# Patient Record
Sex: FEMALE | Race: WHITE | NOT HISPANIC OR LATINO | Employment: OTHER | URBAN - METROPOLITAN AREA
[De-identification: names, ages, dates, MRNs, and addresses within clinical notes are randomized per-mention and may not be internally consistent; named-entity substitution may affect disease eponyms.]

---

## 2017-11-02 ENCOUNTER — HISTORICAL (OUTPATIENT)
Dept: ADMINISTRATIVE | Facility: HOSPITAL | Age: 62
End: 2017-11-02

## 2019-01-28 ENCOUNTER — HISTORICAL (OUTPATIENT)
Dept: ADMINISTRATIVE | Facility: HOSPITAL | Age: 64
End: 2019-01-28

## 2019-03-01 ENCOUNTER — HOSPITAL ENCOUNTER (OUTPATIENT)
Dept: MEDSURG UNIT | Facility: HOSPITAL | Age: 64
End: 2019-03-02
Attending: INTERNAL MEDICINE | Admitting: INTERNAL MEDICINE

## 2022-04-10 ENCOUNTER — HISTORICAL (OUTPATIENT)
Dept: ADMINISTRATIVE | Facility: HOSPITAL | Age: 67
End: 2022-04-10

## 2022-04-24 VITALS
SYSTOLIC BLOOD PRESSURE: 117 MMHG | DIASTOLIC BLOOD PRESSURE: 71 MMHG | BODY MASS INDEX: 44.95 KG/M2 | HEIGHT: 62 IN | WEIGHT: 244.25 LBS

## 2022-05-03 NOTE — HISTORICAL [facility]ER
This is a historical note converted from [employer]. Formatting and pictures may have been removed.  Please reference [employer] for original formatting and attached multimedia. Chief Complaint  BILATERAL KNEE PAIN SHE IS STILL HAVING PAIN SINCE 2017.  History of Present Illness  Patient returns today for repeat exam. ?Patient continues to have bilateral knee pain right greater than left. ?She has pain while getting up from a seated position long standing and walking. ?She has had previous injections in the past which has worn off those day did very well.? Denies any new trauma she denies any new complaints.  Physical Exam  Vitals & Measurements  HT:?157.48?cm? WT:?109.31?kg? BMI:?44.08?  Bilateral lower extremities compartments are soft and warm. ?Skin is intact with no signs or symptoms of DVT or infection. ?She is tender along the medial lateral joint line positive patella grind negative apprehension she is stable to stressing, negative [place] she does walk with a slight antalgic gait favoring the right and left knee?she is neurovascular intact distally. ?X-rays 3 views of the left and right knee demonstrates bone-on-bone arthritis?medial compartment  Assessment/Plan  1.?Bilateral primary osteoarthritis of knee?M17.0  ? At this time we discussed her physical exam and x-ray findings. ?We have discussed underlying degenerative changes in both knees. ?We have discussed various treatment options. ?She will continue low impact and quad strengthening exercises. ?Under sterile technique she tolerated a?steroid injection very well to the anterolateral portal of her right and left knee.? I would like to see her back in 4 weeks to see how she is progressing. ?We have discussed Synvisc injections as well. ?We have also discussed?a knee replacement.  Ordered:  betamethasone, 24 mg, Intra-Articular, Once, first dose 01/28/19 14:00:00 CST, stop date 01/28/19 14:00:00 CST  Lidocaine inj., 4 mL, Intra-Articular, Once, first dose  01/28/19 13:28:00 CST, stop date 01/28/19 13:28:00 CST  Lidocaine inj., 10 mL, Intra-Articular, Once, first dose 01/28/19 11:42:00 CST, stop date 01/28/19 11:42:00 CST, Administer 5mL into each joint  1036F Current tobacco non-user, 01/28/19 11:42:00 CST  1170F Functional Status Assessment, 01/28/19 11:42:00 CST  3008F Body Mass Index (BMI), documented, 01/28/19 11:42:00 CST  Asp/Inj (Bilateral) Major Jnt/Bursa [id number], 01/28/19 11:42:00 CST, [facility], Routine, 01/28/19 11:42:00 CST  Asp/Inj (Bilateral) Major Jnt/Bursa [id number], 01/28/19 13:28:00 CST, [facility], Routine, 01/28/19 13:28:00 CST  Clinic Follow up, *Est. 02/25/19 3:00:00 CST, Order for future visit, Bilateral primary osteoarthritis of knee, Orthopaedics  Office/Outpatient Visit Level 4 Established [id number] PC, Bilateral primary osteoarthritis of knee, Orthopaedics Clinic, 01/28/19 11:42:00 CST  ?  Left knee pain?M25.562  ?  Right knee DJD?M17.11  ?   Problem List/Past Medical History  Ongoing  Arthritis  CAD (coronary atherosclerotic disease)  Dyslipidemia  HTN (hypertension)  Mitral valve disorder  Morbid obesity  Shortness of breath  Sleep apnea  Stroke  Historical  No qualifying data  Procedure/Surgical History  cardiac stents  carpal tunnel  Thumb surgery   Medications  aspirin 81 mg oral tablet, 81 mg= 1 tab(s), Oral, Daily,? ?Not taking  biotin 1000 mcg oral tablet, 1000 mcg= 1 tab(s), Oral, Daily,? ?Not taking  Celestone, 24 mg, Intra-Articular, Once  Duexis 800 mg-26.6 mg oral tablet, 1 tab(s), Oral, TID,? ?Not Taking, Completed Rx  fluticasone 50 mcg/inh nasal spray, 1 spray(s), Nasal, Once  furosemide 40 mg oral tablet, 40 mg= 1 tab(s), Oral, Daily,? ?Not Taking per Prescriber  Elma M20 oral tablet, extended release, 20 mEq= 1 tab(s), Oral, Daily,? ?Not taking  lidocaine 2% injectable solution, 4 mL, Intra-Articular, Once  Lidocaine inj., 10 mL, Intra-Articular, Once  lovastatin 40 mg oral tablet, 40 mg= 1  tab(s), Oral, Daily,? ?Not taking  MegaKrill oral capsule  metoprolol tartrate 25 mg oral tablet, 12.5 mg= 0.5 tab(s), Oral, BID  Nascobal 500 mcg/0.1 mL nasal spray, 1 spray(s), Nasal, qWeek  One-A-Day 50+ oral tablet, 1 tab(s), Oral, Daily  Paxil 20 mg oral tablet, 20 mg= 1 tab(s), Oral, Daily,? ?Not taking  Plavix 75 mg oral tablet, 75 mg= 1 tab(s), Oral, Daily  Ranexa 1000 mg oral tablet, extended release, 1000 mg= 1 tab(s), Oral, BID,? ?Not taking  spironolactone 25 mg oral tablet, 25 mg= 1 tab(s), Oral, BID  Super B Complex oral tablet, 1 tab(s), Oral, Daily  Symbicort 80 mcg-4.5 mcg/inh inhalation aerosol, 2 puff(s), INH, BID  Vitamin D 50,000 intl units oral capsule, 65419 IntUnit= 1 cap(s), Oral, q7day  Xarelto 20mg Tablet, 20 mg, Oral, With Supper  Xyzal 5 mg oral tablet, 2.5 mg= 0.5 tab(s), Oral, qPM  Allergies  No Known Medication Allergies  Social History  Alcohol  Past, 07/28/2016  Employment/School  Employed, 07/28/2016  Home/Environment  Lives with Alone., 07/28/2016  Tobacco  Never (less than 100 in lifetime), No, 01/28/2019  Never smoker, 07/28/2016  Family History  Acute myocardial infarction.: Father.  Alzheimers disease: Mother.  Coronary artery disease: Brother.  Diabetes mellitus type 2: Brother.  Health Maintenance  Health Maintenance  ???Pending?(in the next year)  ??? ??OverDue  ??? ? ? ?Coronary Artery Disease Maintenance-Lipid Lowering Therapy due??and every?  ??? ? ? ?Hypertension Management-BMP due??07/28/17??and every 1??year(s)  ??? ? ? ?Advance Directive due??07/28/17??and every 1??year(s)  ??? ? ? ?Aspirin Therapy for CVD Prevention due??07/28/17??and every 1??year(s)  ??? ? ? ?Fall Risk Assessment due??08/01/17??and every 1??year(s)  ??? ? ? ?Hypertension Management-Blood Pressure due??11/27/18??and every 1??year(s)  ??? ??Due?  ??? ? ? ?ADL Screening due??01/29/19??and every 1??year(s)  ??? ? ? ?Alcohol Misuse Screening due??01/29/19??and every 1??year(s)  ??? ? ? ?Bone Density  Screening due??01/29/19??Variable frequency  ??? ? ? ?Breast Cancer Screening (Sen[facility] Wellness) due??01/29/19??and every?  ??? ? ? ?Cognitive Screening due??01/29/19??and every 1??year(s)  ??? ? ? ?Colorectal Screening (Senior Wellness) due??01/29/19??and every?  ??? ? ? ?Functional Assessment due??01/29/19??and every 1??year(s)  ??? ? ? ?Geriatric Depression Screening due??01/29/19??and every 1??year(s)  ??? ? ? ?Pneumococcal Vaccine due??01/29/19??Variable frequency  ??? ? ? ?Pneumococcal Vaccine due??01/29/19??and every?  ??? ? ? ?Tetanus Vaccine due??01/29/19??and every 10??year(s)  ??? ? ? ?Zoster Vaccine due??01/29/19??and every 100??year(s)  ??? ??Due In Future?  ??? ? ? ?Obesity Screening not due until??01/28/20??and every 1??year(s)  ???Satisfied?(in the past 1 year)  ??? ??Satisfied?  ??? ? ? ?Body Mass Index Check on??01/28/19.??Satisfied by Krystal Suarez  ??? ? ? ?Obesity Screening on??01/28/19.??Satisfied by Krystal Suarez  ?  ?

## 2022-05-03 NOTE — HISTORICAL [facility]
This is a historical note converted from [employer]. Formatting and pictures may have been removed.  Please reference [employer] for original formatting and attached multimedia. Chief Complaint  B/L knee pain  History of Present Illness  She will patient comes in today complaining of right knee pain.? Patient states of the last couple weeks she has noticed increasing pain with long standing walking bending in her right knee.? Occasional swelling.? She denies any instability.? She is tried some rest and medication without relief.? She states she has done well in the past with a steroid injection.  Physical Exam  Vitals & Measurements  HR:?76?(Peripheral)? BP:?118/75?  HT:?157.48?cm? HT:?157.48?cm? WT:?109.31?kg? WT:?109.31?kg? BMI:?44.08?  Right lower external compartments soft and warm. ?Skin is intact. ?There is no signs or symptoms of DVT or infection. ?She is tender along the medial lateral joint line positive patella grind. ?She walks with a normal gait she is mildly tender along the medial joint line her motion 0-120? she is stable to stressing she is neurovascular intact distally. ?X-rays 3 views the right knee demonstrates mild to moderate tricompartment osteoarthritis  Assessment/Plan  1.?Osteoarthritis of right knee  ? At this time we discussed her physical exam and x-ray findings. ?I suspect she likely has increasing pain due to her underlying arthritis. ?There is no obvious meniscus or ligament injury appreciated today. ?Under sterile technique she tolerated steroid injection very well for the anterolateral portal of her right knee. ?We have also discussed Synvisc injections if needed. ?I would like to see her back in 2 weeks to see how she is progressing.  Ordered:  dexamethasone, 8 mg, Intra-Articular, Once, first dose 11/02/17 16:00:00 CDT, stop date 11/02/17 16:00:00 CDT, 24  Lidocaine inj., 5 mL, Intra-Articular, Once, first dose 11/02/17 15:03:00 CDT, stop date 11/02/17 15:03:00 CDT  asp/inj jnt/bursa,  major [id number] PC, 11/02/17 15:03:00 CDT, [facility], Routine, 11/02/17 15:03:00 CDT  Clinic Follow up, *Est. 11/16/17 13:45:00 CST, Order for future visit, Osteoarthritis of right knee, [facility]  Office/Outpatient Visit Level 3 Established [id number] PC, Osteoarthritis of right knee, [facility], 11/02/17 15:03:00 CDT  ?  Pain in right knee  ?   Problem List/Past Medical History  Ongoing  Arthritis  CAD (coronary atherosclerotic disease)  Dyslipidemia  HTN (hypertension)  Mitral valve disorder  Morbid obesity  Shortness of breath  Sleep apnea  Stroke  Historical  Procedure/Surgical History  Catheter placement in coronary artery(s) for coronary angiography, including intraprocedural injection(s) for coronary angiography, imaging supervision and interpretation; with right and left heart catheterization including intraprocedural injection(s) fo (08/01/2016)  Fluoroscopy of Left Heart using Low Osmolar Contrast (08/01/2016)  Fluoroscopy of Multiple Coronary Arteries using Low Osmolar Contrast (08/01/2016)  Intravascular Doppler velocity and/or pressure derived coronary flow reserve measurement (coronary vessel or graft) during coronary angiography including pharmacologically induced stress; initial vessel (List separately in addition to code for primary pro (08/01/2016)  Measurement of Arterial Flow, Coronary, Percutaneous Approach (08/01/2016)  Measurement of Arterial Pressure, Coronary, Percutaneous Approach (08/01/2016)  Measurement of Cardiac Sampling and Pressure, Bilateral, Percutaneous Approach (08/01/2016)  cardiac stents  carpal tunnel  Thumb surgery  Medications  aspirin 81 mg oral tablet, 81 mg= 1 tab(s), Oral, Daily  biotin 1000 mcg oral tablet, 1000 mcg= 1 tab(s), Oral, Daily  dexamethasone, 8 mg, Intra-Articular, Once  fluticasone 50 mcg/inh nasal spray, 1 spray(s), Nasal, Once  furosemide 40 mg oral tablet, 40 mg= 1 tab(s), Oral, Daily  Klor-Con M20 oral tablet, extended release,  20 mEq= 1 tab(s), Oral, Daily,? ?Not taking  Lidocaine inj., 5 mL, Intra-Articular, Once  lovastatin 40 mg oral tablet, 40 mg= 1 tab(s), Oral, Daily  MegaKrill oral capsule  metoprolol tartrate 25 mg oral tablet, 12.5 mg= 0.5 tab(s), Oral, BID  Nascobal 500 mcg/0.1 mL nasal spray, 1 spray(s), Nasal, qWeek  One-A-Day 50+ oral tablet, 1 tab(s), Oral, Daily  Paxil 20 mg oral tablet, 20 mg= 1 tab(s), Oral, Daily  Plavix 75 mg oral tablet, 75 mg= 1 tab(s), Oral, Daily  Ranexa 1000 mg oral tablet, extended release, 1000 mg= 1 tab(s), Oral, BID  spironolactone 25 mg oral tablet, 25 mg= 1 tab(s), Oral, BID  Super B Complex oral tablet, 1 tab(s), Oral, Daily  Symbicort 80 mcg-4.5 mcg/inh inhalation aerosol, 2 puff(s), INH, BID  Vitamin D 50,000 intl units oral capsule, 06186 IntUnit= 1 cap(s), Oral, q7day  Xarelto 20mg Tablet, 20 mg, Oral, With Supper  Xyzal 5 mg oral tablet, 2.5 mg= 0.5 tab(s), Oral, qPM  Allergies  No Known Medication Allergies  Social History  Alcohol - 07/28/2016  Past  Employment/School - 07/28/2016  Employed  Home/Environment - 07/28/2016  Lives with Alone.  Tobacco - 07/28/2016  Never smoker  Family History  Acute myocardial infarction.: Father.  Alzheimers disease: Mother.  Coronary artery disease: Brother.  Diabetes mellitus type 2: Brother.

## 2023-09-08 ENCOUNTER — HOSPITAL ENCOUNTER (OUTPATIENT)
Facility: HOSPITAL | Age: 68
Discharge: HOME | End: 2023-09-08
Attending: INTERNAL MEDICINE | Admitting: INTERNAL MEDICINE
Payer: MEDICARE

## 2023-09-08 VITALS
TEMPERATURE: 98 F | DIASTOLIC BLOOD PRESSURE: 69 MMHG | BODY MASS INDEX: 42.11 KG/M2 | RESPIRATION RATE: 17 BRPM | HEART RATE: 78 BPM | SYSTOLIC BLOOD PRESSURE: 105 MMHG | OXYGEN SATURATION: 95 % | HEIGHT: 62 IN | WEIGHT: 228.81 LBS

## 2023-09-08 DIAGNOSIS — I25.10 CAD (CORONARY ARTERY DISEASE): ICD-10-CM

## 2023-09-08 DIAGNOSIS — R06.09 DYSPNEA ON EXERTION: ICD-10-CM

## 2023-09-08 PROCEDURE — 25000003 PHARM REV CODE 250: Performed by: INTERNAL MEDICINE

## 2023-09-08 PROCEDURE — 27201423 OPTIME MED/SURG SUP & DEVICES STERILE SUPPLY: Performed by: INTERNAL MEDICINE

## 2023-09-08 PROCEDURE — C1887 CATHETER, GUIDING: HCPCS | Performed by: INTERNAL MEDICINE

## 2023-09-08 PROCEDURE — 63600175 PHARM REV CODE 636 W HCPCS: Performed by: INTERNAL MEDICINE

## 2023-09-08 PROCEDURE — C1769 GUIDE WIRE: HCPCS | Performed by: INTERNAL MEDICINE

## 2023-09-08 PROCEDURE — C1725 CATH, TRANSLUMIN NON-LASER: HCPCS | Performed by: INTERNAL MEDICINE

## 2023-09-08 PROCEDURE — 93005 ELECTROCARDIOGRAM TRACING: CPT | Mod: 59

## 2023-09-08 PROCEDURE — 93010 ELECTROCARDIOGRAM REPORT: CPT | Mod: ,,, | Performed by: INTERNAL MEDICINE

## 2023-09-08 PROCEDURE — 93010 EKG 12-LEAD: ICD-10-PCS | Mod: ,,, | Performed by: INTERNAL MEDICINE

## 2023-09-08 PROCEDURE — C1885 CATH, TRANSLUMIN ANGIO LASER: HCPCS | Performed by: INTERNAL MEDICINE

## 2023-09-08 PROCEDURE — 99153 MOD SED SAME PHYS/QHP EA: CPT | Performed by: INTERNAL MEDICINE

## 2023-09-08 PROCEDURE — 93458 L HRT ARTERY/VENTRICLE ANGIO: CPT | Mod: 59 | Performed by: INTERNAL MEDICINE

## 2023-09-08 PROCEDURE — C1753 CATH, INTRAVAS ULTRASOUND: HCPCS | Performed by: INTERNAL MEDICINE

## 2023-09-08 PROCEDURE — C1894 INTRO/SHEATH, NON-LASER: HCPCS | Performed by: INTERNAL MEDICINE

## 2023-09-08 PROCEDURE — 99152 MOD SED SAME PHYS/QHP 5/>YRS: CPT | Performed by: INTERNAL MEDICINE

## 2023-09-08 PROCEDURE — 92978 ENDOLUMINL IVUS OCT C 1ST: CPT | Mod: LD | Performed by: INTERNAL MEDICINE

## 2023-09-08 PROCEDURE — C1874 STENT, COATED/COV W/DEL SYS: HCPCS | Performed by: INTERNAL MEDICINE

## 2023-09-08 PROCEDURE — 25500020 PHARM REV CODE 255: Performed by: INTERNAL MEDICINE

## 2023-09-08 PROCEDURE — C9602 PERC D-E COR STENT ATHER S: HCPCS | Mod: LD | Performed by: INTERNAL MEDICINE

## 2023-09-08 DEVICE — EVEROLIMUS-ELUTING PLATINUM CHROMIUM CORONARY STENT SYSTEM
Type: IMPLANTABLE DEVICE | Site: HEART | Status: FUNCTIONAL
Brand: SYNERGY™ XD

## 2023-09-08 RX ORDER — ROSUVASTATIN CALCIUM 40 MG/1
10 TABLET, COATED ORAL NIGHTLY
COMMUNITY

## 2023-09-08 RX ORDER — MIDAZOLAM HYDROCHLORIDE 1 MG/ML
INJECTION INTRAMUSCULAR; INTRAVENOUS
Status: DISCONTINUED | OUTPATIENT
Start: 2023-09-08 | End: 2023-09-08 | Stop reason: HOSPADM

## 2023-09-08 RX ORDER — HYDROCODONE BITARTRATE AND ACETAMINOPHEN 5; 325 MG/1; MG/1
1 TABLET ORAL EVERY 4 HOURS PRN
Status: DISCONTINUED | OUTPATIENT
Start: 2023-09-08 | End: 2023-09-08 | Stop reason: HOSPADM

## 2023-09-08 RX ORDER — EZETIMIBE 10 MG/1
10 TABLET ORAL DAILY
COMMUNITY

## 2023-09-08 RX ORDER — SODIUM CHLORIDE 9 MG/ML
INJECTION, SOLUTION INTRAVENOUS ONCE
Status: ACTIVE | OUTPATIENT
Start: 2023-09-08

## 2023-09-08 RX ORDER — ACETAMINOPHEN 325 MG/1
650 TABLET ORAL EVERY 4 HOURS PRN
Status: DISCONTINUED | OUTPATIENT
Start: 2023-09-08 | End: 2023-09-08 | Stop reason: HOSPADM

## 2023-09-08 RX ORDER — FENTANYL CITRATE 50 UG/ML
INJECTION, SOLUTION INTRAMUSCULAR; INTRAVENOUS
Status: DISCONTINUED | OUTPATIENT
Start: 2023-09-08 | End: 2023-09-08 | Stop reason: HOSPADM

## 2023-09-08 RX ORDER — MULTIVIT-MIN/FA/LYCOPEN/LUTEIN .4-300-25
1 TABLET ORAL DAILY
COMMUNITY

## 2023-09-08 RX ORDER — METOPROLOL SUCCINATE 25 MG/1
25 TABLET, EXTENDED RELEASE ORAL 2 TIMES DAILY
COMMUNITY

## 2023-09-08 RX ORDER — DIPHENHYDRAMINE HCL 25 MG
50 CAPSULE ORAL
Status: DISPENSED | OUTPATIENT
Start: 2023-09-08

## 2023-09-08 RX ORDER — ASPIRIN 81 MG/1
81 TABLET ORAL DAILY
COMMUNITY

## 2023-09-08 RX ORDER — VERAPAMIL HYDROCHLORIDE 2.5 MG/ML
INJECTION, SOLUTION INTRAVENOUS
Status: DISCONTINUED | OUTPATIENT
Start: 2023-09-08 | End: 2023-09-08 | Stop reason: HOSPADM

## 2023-09-08 RX ORDER — HEPARIN SODIUM 1000 [USP'U]/ML
INJECTION, SOLUTION INTRAVENOUS; SUBCUTANEOUS
Status: DISCONTINUED | OUTPATIENT
Start: 2023-09-08 | End: 2023-09-08 | Stop reason: HOSPADM

## 2023-09-08 RX ORDER — ONDANSETRON 4 MG/1
8 TABLET, ORALLY DISINTEGRATING ORAL EVERY 8 HOURS PRN
Status: DISCONTINUED | OUTPATIENT
Start: 2023-09-08 | End: 2023-09-08 | Stop reason: HOSPADM

## 2023-09-08 RX ORDER — NITROGLYCERIN 20 MG/100ML
INJECTION INTRAVENOUS
Status: DISCONTINUED | OUTPATIENT
Start: 2023-09-08 | End: 2023-09-08 | Stop reason: HOSPADM

## 2023-09-08 RX ORDER — MORPHINE SULFATE 4 MG/ML
2 INJECTION, SOLUTION INTRAMUSCULAR; INTRAVENOUS EVERY 4 HOURS PRN
Status: DISCONTINUED | OUTPATIENT
Start: 2023-09-08 | End: 2023-09-08 | Stop reason: HOSPADM

## 2023-09-08 RX ORDER — LIDOCAINE HYDROCHLORIDE 10 MG/ML
INJECTION INFILTRATION; PERINEURAL
Status: DISCONTINUED | OUTPATIENT
Start: 2023-09-08 | End: 2023-09-08 | Stop reason: HOSPADM

## 2023-09-08 RX ORDER — AZELASTINE 1 MG/ML
1 SPRAY, METERED NASAL NIGHTLY
COMMUNITY

## 2023-09-08 RX ORDER — DIAZEPAM 5 MG/1
10 TABLET ORAL
Status: DISPENSED | OUTPATIENT
Start: 2023-09-08

## 2023-09-08 RX ORDER — SODIUM CHLORIDE 9 MG/ML
INJECTION, SOLUTION INTRAVENOUS CONTINUOUS
Status: ACTIVE | OUTPATIENT
Start: 2023-09-08 | End: 2023-09-08

## 2023-09-08 RX ORDER — MODAFINIL 200 MG/1
200 TABLET ORAL DAILY PRN
COMMUNITY

## 2023-09-08 RX ORDER — PNV NO.95/FERROUS FUM/FOLIC AC 28MG-0.8MG
100 TABLET ORAL DAILY
COMMUNITY

## 2023-09-08 RX ORDER — CLOPIDOGREL BISULFATE 75 MG/1
75 TABLET ORAL DAILY
Qty: 90 TABLET | Refills: 3
Start: 2023-09-08 | End: 2024-09-07

## 2023-09-08 RX ORDER — CLOPIDOGREL 300 MG/1
600 TABLET, FILM COATED ORAL
Status: DISPENSED | OUTPATIENT
Start: 2023-09-08

## 2023-09-08 RX ORDER — HYDRALAZINE HYDROCHLORIDE 20 MG/ML
10 INJECTION INTRAMUSCULAR; INTRAVENOUS EVERY 4 HOURS PRN
Status: DISCONTINUED | OUTPATIENT
Start: 2023-09-08 | End: 2023-09-08 | Stop reason: HOSPADM

## 2023-09-08 RX ORDER — ASPIRIN 325 MG
TABLET, DELAYED RELEASE (ENTERIC COATED) ORAL
Status: DISCONTINUED | OUTPATIENT
Start: 2023-09-08 | End: 2023-09-08 | Stop reason: HOSPADM

## 2023-09-08 RX ORDER — ERGOCALCIFEROL 1.25 MG/1
50000 CAPSULE ORAL
COMMUNITY

## 2023-09-08 RX ADMIN — CLOPIDOGREL BISULFATE 600 MG: 300 TABLET, FILM COATED ORAL at 08:09

## 2023-09-08 RX ADMIN — DIAZEPAM 10 MG: 5 TABLET ORAL at 08:09

## 2023-09-08 RX ADMIN — DIPHENHYDRAMINE HYDROCHLORIDE 50 MG: 25 CAPSULE ORAL at 08:09

## 2023-09-08 RX ADMIN — SODIUM CHLORIDE: 9 INJECTION, SOLUTION INTRAVENOUS at 11:09

## 2023-09-08 NOTE — DISCHARGE INSTRUCTIONS
Remove dressing and armboard in 24hrs.  - Can shower in 24hrs, use soap and water only.   -No driving for two Days  -Do not lift anything heavier than a gallon of milk for 5 days.  -Do not submerge site under water for 5 days.   -No lotions, powders, creams around site for 5 days.  - Return to the nearest emergency room if you start running a fever; have any kind of discharge coming from the site, the site looks red or swollen.  - If site starts to bleed, lay flat on the ground, apply pressure to the site and call 911.     Resume Xarelto tomorrow morning.    prescription for Plavix at the [facility] in [place].

## 2023-09-08 NOTE — Clinical Note
An angiography was performed of the left coronary arteries. The angiography was performed via power injection.  POST

## 2023-09-08 NOTE — INTERVAL H&P NOTE
Patient name: Rosemary Cunha  MRN: [id number]  : 1955  Cath Lab Procedure H&P Update    Pre-Procedure Assessment:    I saw and examined the patient face to face. The patient has been re-evaluated and her condition is unchanged. The reason for admission, procedure and care is still present.  Based on the patients H&P, pre-procedure physical exam, relevant diagnostic studies, NPO status and information obtained from the patient, I determine the patient is an appropriate candidate for the proposed procedure and anesthesia planned. I further certify the anesthesia risks, benefits and options have been explained to the patient to which she agrees as documented on the procedural consent.

## 2023-09-08 NOTE — DISCHARGE SUMMARY
[facility] - Cath Lab Services  Discharge Note  Short Stay    Procedure(s) (LRB):  Left heart cath (Left)      OUTCOME: Patient tolerated treatment/procedure well without complication and is now ready for discharge.    DISPOSITION: Home or Self Care    FINAL DIAGNOSIS:  CAD s/p PCI    FOLLOWUP: In clinic    DISCHARGE INSTRUCTIONS:  See dc orders.       Clinical Reference Documents Added to Patient Instructions         Document    CARDIAC CATHETERIZATION (ENGLISH)    CARDIAC REHAB INFORMATION (ENGLISH)    LOWERING YOUR RISK OF HEART DISEASE (ENGLISH)            TIME SPENT ON DISCHARGE: 31 minutes

## 2023-09-08 NOTE — Clinical Note
The radial band was applied to the right radial artery. 12 cc's of air were inserted into the closure device. Right Ulnar

## 2023-09-08 NOTE — Clinical Note
A percutaneous stick to the right ulnar artery was performed. Ultrasound guidance was used to obtain access.

## 2023-09-08 NOTE — Clinical Note
The catheter was inserted into the, was removed from the and was inserted over the wire into the mid   left anterior descending. IVUS performed

## 2023-09-08 NOTE — Clinical Note
The catheter was inserted into the, was removed from the and was inserted over the wire into the left ventricle. Hemodynamics were performed.

## (undated) DEVICE — HEMOSTAT VASC BAND REG 24CM

## (undated) DEVICE — CATH IMPULSE FL4 5FR 100CM

## (undated) DEVICE — WIRE GUIDE INQWIRE STR 180CM

## (undated) DEVICE — KIT COPILOT VALVE HEMO TOOL

## (undated) DEVICE — CANNULA NASAL ADULT

## (undated) DEVICE — CATH NC EMERGE MR 2.5X20MM

## (undated) DEVICE — KIT GLIDESHEATH SLEND 6FR 10CM

## (undated) DEVICE — PAD DEFIB CADENCE ADULT R2

## (undated) DEVICE — CATH OPTITORQUE RADIAL 5FR

## (undated) DEVICE — KIT MANIFOLD LOW PRESS TUBING

## (undated) DEVICE — CATH EAGLE EYE PLATINUM

## (undated) DEVICE — DEVICE BASIXCOMPAK INFL 20ML

## (undated) DEVICE — KIT HAND CONTROL HIGH PRESSUR

## (undated) DEVICE — GUIDE RUNWAY 6FR CLS 3.5

## (undated) DEVICE — DRAPE ANGIO BRACH 38X44IN

## (undated) DEVICE — GUIDEWIRE RUNTHROUGH EF 180CM

## (undated) DEVICE — CATH LASER POINT ELCA 9 X 80